# Patient Record
Sex: MALE | Race: WHITE | Employment: FULL TIME | URBAN - METROPOLITAN AREA
[De-identification: names, ages, dates, MRNs, and addresses within clinical notes are randomized per-mention and may not be internally consistent; named-entity substitution may affect disease eponyms.]

---

## 2017-07-26 ENCOUNTER — HOSPITAL ENCOUNTER (OUTPATIENT)
Age: 32
Discharge: HOME | End: 2017-07-26
Attending: EMERGENCY MEDICINE
Payer: COMMERCIAL

## 2017-07-26 VITALS
HEART RATE: 63 BPM | TEMPERATURE: 98 F | SYSTOLIC BLOOD PRESSURE: 129 MMHG | HEIGHT: 72 IN | BODY MASS INDEX: 23.7 KG/M2 | WEIGHT: 175 LBS | RESPIRATION RATE: 18 BRPM | DIASTOLIC BLOOD PRESSURE: 65 MMHG | OXYGEN SATURATION: 99 %

## 2017-07-26 DIAGNOSIS — K04.7 DENTAL ABSCESS: Primary | ICD-10-CM

## 2017-07-26 PROCEDURE — 99203 OFFICE O/P NEW LOW 30 MIN: CPT

## 2017-07-26 PROCEDURE — 99204 OFFICE O/P NEW MOD 45 MIN: CPT

## 2017-07-26 RX ORDER — CITALOPRAM 10 MG/1
40 TABLET ORAL DAILY
COMMUNITY

## 2017-07-26 RX ORDER — AMOXICILLIN AND CLAVULANATE POTASSIUM 875; 125 MG/1; MG/1
1 TABLET, FILM COATED ORAL 2 TIMES DAILY
Qty: 20 TABLET | Refills: 0 | Status: SHIPPED | OUTPATIENT
Start: 2017-07-26 | End: 2017-08-05

## 2017-07-26 NOTE — ED PROVIDER NOTES
Patient Seen in: [address]    History   Patient presents with:  Swelling Edema (cardiovascular, metabolic)    Stated Complaint: swollen face    HPI    26-year-old male with history of needing a root canal on the left pr area at the base of the corresponding teeth is firm and swollen but not fluctuant, moderate tenderness. Eyes: EOM are normal.   Neck: Normal range of motion. Pulmonary/Chest: Effort normal.   Musculoskeletal: Normal range of motion.    Neurological: He

## 2017-07-26 NOTE — ED INITIAL ASSESSMENT (HPI)
Left cheek swollen; co-worker noticed today and informed patient; not bothersome to patient; no recent dental work; no issues with moving jaw

## 2018-01-05 ENCOUNTER — APPOINTMENT (OUTPATIENT)
Dept: GENERAL RADIOLOGY | Age: 33
End: 2018-01-05
Attending: EMERGENCY MEDICINE
Payer: COMMERCIAL

## 2018-01-05 ENCOUNTER — HOSPITAL ENCOUNTER (OUTPATIENT)
Age: 33
Discharge: HOME | End: 2018-01-05
Attending: EMERGENCY MEDICINE
Payer: COMMERCIAL

## 2018-01-05 VITALS
TEMPERATURE: 98 F | HEIGHT: 72 IN | HEART RATE: 86 BPM | WEIGHT: 175 LBS | RESPIRATION RATE: 18 BRPM | DIASTOLIC BLOOD PRESSURE: 73 MMHG | OXYGEN SATURATION: 100 % | SYSTOLIC BLOOD PRESSURE: 131 MMHG | BODY MASS INDEX: 23.7 KG/M2

## 2018-01-05 DIAGNOSIS — J11.1 INFLUENZA: Primary | ICD-10-CM

## 2018-01-05 LAB — S PYO AG THROAT QL: NEGATIVE

## 2018-01-05 PROCEDURE — 99213 OFFICE O/P EST LOW 20 MIN: CPT

## 2018-01-05 PROCEDURE — 87430 STREP A AG IA: CPT

## 2018-01-05 PROCEDURE — 71046 X-RAY EXAM CHEST 2 VIEWS: CPT | Performed by: EMERGENCY MEDICINE

## 2018-01-05 PROCEDURE — 99214 OFFICE O/P EST MOD 30 MIN: CPT

## 2018-01-05 RX ORDER — OSELTAMIVIR PHOSPHATE 75 MG/1
75 CAPSULE ORAL 2 TIMES DAILY
Qty: 10 CAPSULE | Refills: 0 | Status: SHIPPED | OUTPATIENT
Start: 2018-01-05 | End: 2018-01-10

## 2018-01-05 NOTE — ED INITIAL ASSESSMENT (HPI)
Fever   Bodyaches  Nasal congestion  Symptoms started yesterday  Slight cough  NO nausea or vomiting

## 2018-01-06 NOTE — ED PROVIDER NOTES
Patient presents with:  Fever      HPI:     Raegan Bush is a 28year old male who presents for evaluation of a chief complaint of  a cough Onset of symptoms was in the past day. The patient also complains of fever nasal congestion and body aches.       P (TAMIFLU) 75 MG Oral Cap          Sig: Take 1 capsule (75 mg total) by mouth 2 (two) times daily.           Dispense:  10 capsule          Refill:  0  Xr Chest Pa + Lat Chest (cpt=71046)    Result Date: 1/5/2018  PROCEDURE: XR CHEST PA + LAT CHEST (CPT=7102